# Patient Record
Sex: FEMALE | Race: WHITE | NOT HISPANIC OR LATINO | URBAN - METROPOLITAN AREA
[De-identification: names, ages, dates, MRNs, and addresses within clinical notes are randomized per-mention and may not be internally consistent; named-entity substitution may affect disease eponyms.]

---

## 2019-06-05 ENCOUNTER — APPOINTMENT (OUTPATIENT)
Dept: WOMENS IMAGING | Facility: HOSPITAL | Age: 74
End: 2019-06-05

## 2019-06-05 PROCEDURE — 77067 SCR MAMMO BI INCL CAD: CPT | Performed by: RADIOLOGY

## 2020-06-22 ENCOUNTER — APPOINTMENT (OUTPATIENT)
Dept: WOMENS IMAGING | Facility: HOSPITAL | Age: 75
End: 2020-06-22

## 2020-06-22 PROCEDURE — 77067 SCR MAMMO BI INCL CAD: CPT | Performed by: RADIOLOGY

## 2023-11-10 ENCOUNTER — TRANSCRIBE ORDERS (OUTPATIENT)
Dept: CT IMAGING | Facility: CLINIC | Age: 78
End: 2023-11-10
Payer: MEDICARE

## 2023-11-10 DIAGNOSIS — Z12.31 ENCOUNTER FOR SCREENING MAMMOGRAM FOR MALIGNANT NEOPLASM OF BREAST: Primary | ICD-10-CM

## 2025-05-28 ENCOUNTER — OFFICE VISIT (OUTPATIENT)
Dept: CARDIOLOGY | Facility: CLINIC | Age: 80
End: 2025-05-28
Payer: MEDICARE

## 2025-05-28 VITALS
WEIGHT: 122 LBS | OXYGEN SATURATION: 100 % | DIASTOLIC BLOOD PRESSURE: 70 MMHG | HEIGHT: 65 IN | BODY MASS INDEX: 20.33 KG/M2 | SYSTOLIC BLOOD PRESSURE: 134 MMHG | HEART RATE: 66 BPM

## 2025-05-28 DIAGNOSIS — E78.00 HYPERCHOLESTEROLEMIA: ICD-10-CM

## 2025-05-28 DIAGNOSIS — I49.1 PREMATURE ATRIAL COMPLEXES: Primary | ICD-10-CM

## 2025-05-28 DIAGNOSIS — I49.9 IRREGULAR HEART RHYTHM: ICD-10-CM

## 2025-05-28 PROBLEM — Z85.820 HX OF MALIGNANT MELANOMA: Status: ACTIVE | Noted: 2025-05-28

## 2025-05-28 PROBLEM — C50.919 BREAST CANCER: Status: ACTIVE | Noted: 2025-05-28

## 2025-05-28 PROBLEM — Z51.81 ENCOUNTER FOR THERAPEUTIC DRUG LEVEL MONITORING: Status: ACTIVE | Noted: 2025-05-28

## 2025-05-28 PROBLEM — G44.229 CHRONIC TENSION-TYPE HEADACHE, NOT INTRACTABLE: Status: ACTIVE | Noted: 2025-05-28

## 2025-05-28 PROBLEM — C50.919 INFILTRATING DUCTAL CARCINOMA OF BREAST: Status: ACTIVE | Noted: 2025-05-28

## 2025-05-28 PROBLEM — C43.9 MALIGNANT MELANOMA OF SKIN: Status: ACTIVE | Noted: 2025-05-28

## 2025-05-28 PROBLEM — R32 URINARY INCONTINENCE: Status: ACTIVE | Noted: 2025-05-28

## 2025-05-28 PROBLEM — J18.9 PNEUMONIA: Status: ACTIVE | Noted: 2025-05-28

## 2025-05-28 PROBLEM — Z17.0 ESTROGEN RECEPTOR POSITIVE STATUS (ER+): Status: ACTIVE | Noted: 2024-03-12

## 2025-05-28 PROCEDURE — 99204 OFFICE O/P NEW MOD 45 MIN: CPT | Performed by: INTERNAL MEDICINE

## 2025-05-28 PROCEDURE — 93000 ELECTROCARDIOGRAM COMPLETE: CPT | Performed by: INTERNAL MEDICINE

## 2025-05-28 RX ORDER — MIRABEGRON 25 MG/1
25 TABLET, FILM COATED, EXTENDED RELEASE ORAL DAILY
COMMUNITY

## 2025-05-28 RX ORDER — LETROZOLE 2.5 MG/1
TABLET, FILM COATED ORAL DAILY
COMMUNITY

## 2025-05-28 RX ORDER — CHOLECALCIFEROL (VITAMIN D3) 25 MCG
1000 TABLET ORAL DAILY
COMMUNITY

## 2025-05-28 RX ORDER — PROPRANOLOL HYDROCHLORIDE 80 MG/1
80 TABLET ORAL 3 TIMES DAILY
COMMUNITY

## 2025-05-28 NOTE — PROGRESS NOTES
OFFICE VISIT  NOTE  [facility] CARDIOLOGY      Name: Belle Lopez    Date: 2025  MRN:  [id number]  :  1945      REFERRING/PRIMARY PROVIDER:  Orlando Rendon MD    Chief Complaint   Patient presents with    Irregular Heart Beat       HPI: Belle Lopez is a 79 y.o. female who presents for frequent PACs.  Noted by her hematologist Dr. Livingston, who treats her for breast cancer which was diagnosed in , has upcoming last chemotherapy treatments next week.  Echo 2025 at [facility] showed normal ejection fraction with mild mitral valve regurgitation, otherwise normal.  She denies palpitations chest pain shortness of breath or dizziness lightheadedness.  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  Her  is also my patient Mr. Yobany Lopez.    Past Medical History:   Diagnosis Date    Breast cancer     Migraines        History reviewed. No pertinent surgical history.    Social History     Socioeconomic History    Marital status:    Tobacco Use    Smoking status: Never     Passive exposure: Never    Smokeless tobacco: Never   Vaping Use    Vaping status: Never Used   Substance and Sexual Activity    Alcohol use: Yes     Comment: Ocassionally    Drug use: Never       Family History   Problem Relation Age of Onset    Stroke Mother     Stroke Father         ROS:   Constitutional no fever,  no weight loss   Skin no rash, no subcutaneous nodules   Otolaryngeal no difficulty swallowing   Cardiovascular See HPI   Pulmonary no cough, no sputum production   Gastrointestinal no constipation, no diarrhea   Genitourinary no dysuria, no hematuria   Hematologic no easy bruisability, no abnormal bleeding   Musculoskeletal no muscle pain   Neurologic no dizziness, no falls         No Known Allergies      Current Outpatient Medications:     Cholecalciferol 25 MCG (1000 UT) tablet, Take 1 tablet by mouth Daily., Disp: , Rfl:     letrozole (FEMARA) 2.5 MG tablet, Take  by mouth Daily., Disp: , Rfl:  "    Mirabegron ER (Myrbetriq) 25 MG tablet sustained-release 24 hour 24 hr tablet, Take 1 tablet by mouth Daily., Disp: , Rfl:     propranolol (INDERAL) 80 MG tablet, Take 1 tablet by mouth 3 (Three) Times a Day., Disp: , Rfl:     Vitals:    05/28/25 1447   BP: 134/70   Pulse: 66   SpO2: 100%   Weight: 55.3 kg (122 lb)   Height: 165.1 cm (65\")     Body mass index is 20.3 kg/m².    PHYSICAL EXAM:    General Appearance:   well developed  well nourished  HENT:   oropharynx moist  lips not cyanotic  Neck:  thyroid not enlarged  supple  Respiratory:  no respiratory distress  normal breath sounds  no rales  Cardiovascular:  no jugular venous distention  Frequent extrasystolic beats noted on auscultation.  apical impulse normal  S1 normal, S2 normal  no S3, no S4   no murmur  no rub, no thrill  carotid pulses normal; no bruit  lower extremity edema: none      Musculoskeletal:  no clubbing of fingers.   normocephalic, head atraumatic  Skin:   warm, dry  Psychiatric:  judgement and insight appropriate  normal mood and affect    RESULTS:     ECG 12 Lead    Date/Time: 5/28/2025 3:05 PM  Performed by: Stuart Higuera MD    Authorized by: Stuart Higuera MD  Comparison: not compared with previous ECG   Rhythm: sinus rhythm  Ectopy: atrial premature contractions  Rate: normal  BPM: 64  QRS axis: normal    Clinical impression: abnormal EKG  Comments: Frequent PACs noted                Labs:  No results found for: \"CHOL\", \"TRIG\", \"HDL\", \"LDL\", \"AST\", \"ALT\"  No results found for: \"HGBA1C\"  No components found for: \"CREATINININE\"  No results found for: \"EGFRIFNONA\"    Most recent PCP note, imaging tests, and labs reviewed.    ASSESSMENT:  Problem List Items Addressed This Visit       Irregular heart rhythm    Relevant Orders    Holter Monitor - 72 Hour Up To 15 Days    Hypercholesterolemia    Relevant Orders    Holter Monitor - 72 Hour Up To 15 Days    Premature atrial complexes - Primary    Relevant Medications    propranolol " (INDERAL) 80 MG tablet    Other Relevant Orders    Holter Monitor - 72 Hour Up To 15 Days       PLAN:    1.  PACs:  70 Holter monitor ordered  Benign echo 4/17/2025 at [facility] EF 55 to 60% with normal valves  Increase hydration and continue exercise    Unless sustained atrial fibrillation or atrial flutter is found we will not change medications or treatment    She can continue propranolol which she takes for chronic headaches    2.  Breast cancer:  Finishing chemotherapy treatment early June 2025  Diagnosed 2024  Benign echo 4/2025    3.  Chronic headaches:  Continue propranolol at current dose, symptoms are well controlled.    Advance Care Planning   ACP discussion was held with the patient during this visit. Patient has an advance directive (not in EMR), copy requested.         Return to clinic in 9 months, or sooner as needed.    Thank you for the opportunity to share in the care of your patient; please do not hesitate to call me with any questions.     Stuart Higuera MD, FAC, [facility]  Office: [phone] [address] [place] [address]    05/28/25

## 2025-06-26 ENCOUNTER — RESULTS FOLLOW-UP (OUTPATIENT)
Dept: CARDIOLOGY | Facility: CLINIC | Age: 80
End: 2025-06-26
Payer: MEDICARE

## 2025-06-26 NOTE — PROGRESS NOTES
Please inform the patient of their test results.  No need to change medical therapy at this time, continue propranolol. Thank you.